# Patient Record
Sex: MALE | Race: OTHER | URBAN - METROPOLITAN AREA
[De-identification: names, ages, dates, MRNs, and addresses within clinical notes are randomized per-mention and may not be internally consistent; named-entity substitution may affect disease eponyms.]

---

## 2022-06-04 ENCOUNTER — TELEPHONE ENCOUNTER (OUTPATIENT)
Dept: URBAN - METROPOLITAN AREA CLINIC 68 | Facility: CLINIC | Age: 87
End: 2022-06-04

## 2022-06-05 ENCOUNTER — TELEPHONE ENCOUNTER (OUTPATIENT)
Dept: URBAN - METROPOLITAN AREA CLINIC 68 | Facility: CLINIC | Age: 87
End: 2022-06-05

## 2022-06-25 ENCOUNTER — TELEPHONE ENCOUNTER (OUTPATIENT)
Age: 87
End: 2022-06-25

## 2022-06-26 ENCOUNTER — TELEPHONE ENCOUNTER (OUTPATIENT)
Age: 87
End: 2022-06-26

## 2024-11-10 ENCOUNTER — NURSING HOME VISIT (OUTPATIENT)
Dept: POST ACUTE CARE | Facility: EXTERNAL LOCATION | Age: 89
End: 2024-11-10
Payer: MEDICARE

## 2024-11-10 DIAGNOSIS — R60.0 EDEMA OF LEFT UPPER EXTREMITY: Primary | ICD-10-CM

## 2024-11-10 PROCEDURE — 99304 1ST NF CARE SF/LOW MDM 25: CPT | Performed by: INTERNAL MEDICINE

## 2024-11-10 NOTE — PROGRESS NOTES
Pt was seen in the NH.  Pt is 89 yo male with PMH CAD CCOPD pacemaker with aggressive B cell lymphoma with mets here for rehab co left upper ext swelling and left shoulder pain  General appearance: Comfortable, no distress  ROS: No SOB  Medications reviewed  Head: Normal  Neck: Soft  Heart: Regular  Lungs: Clear  Abdomen: soft  Ext left upper ext moderate edema    Plan:   1) left shoulder xr  2) Venous doppler left upper ext.    Problem List Items Addressed This Visit    None  Visit Diagnoses       Edema of left upper extremity    -  Primary

## 2024-11-10 NOTE — LETTER
Patient: Justin Burciaga  : 1935    Encounter Date: 11/10/2024    Pt was seen in the NH.  Pt is 89 yo male with PMH CAD CCOPD pacemaker with aggressive B cell lymphoma with mets here for rehab co left upper ext swelling and left shoulder pain  General appearance: Comfortable, no distress  ROS: No SOB  Medications reviewed  Head: Normal  Neck: Soft  Heart: Regular  Lungs: Clear  Abdomen: soft  Ext left upper ext moderate edema    Plan:   1) left shoulder xr  2) Venous doppler left upper ext.    Problem List Items Addressed This Visit    None  Visit Diagnoses       Edema of left upper extremity    -  Primary               Electronically Signed By: Aj Jarvis MD   11/10/24  6:11 PM

## 2024-11-15 ENCOUNTER — LAB REQUISITION (OUTPATIENT)
Dept: LAB | Facility: HOSPITAL | Age: 89
End: 2024-11-15
Payer: MEDICARE

## 2024-11-15 DIAGNOSIS — K76.9 LIVER DISEASE, UNSPECIFIED: ICD-10-CM

## 2024-11-15 LAB — AMMONIA PLAS-SCNC: 17 UMOL/L (ref 16–53)

## 2024-11-15 PROCEDURE — 82140 ASSAY OF AMMONIA: CPT | Mod: OUT | Performed by: INTERNAL MEDICINE

## 2024-12-17 ENCOUNTER — NURSING HOME VISIT (OUTPATIENT)
Dept: POST ACUTE CARE | Facility: EXTERNAL LOCATION | Age: 89
End: 2024-12-17
Payer: MEDICARE

## 2024-12-17 DIAGNOSIS — C83.30 DIFFUSE LARGE B-CELL LYMPHOMA, UNSPECIFIED BODY REGION (MULTI): Primary | ICD-10-CM

## 2024-12-17 PROCEDURE — 99308 SBSQ NF CARE LOW MDM 20: CPT | Performed by: INTERNAL MEDICINE

## 2024-12-17 NOTE — PROGRESS NOTES
MD Angie Sosa MA   Caller: Unspecified (Today, 11:30 AM)             Local ED, he's had kidney stones in the past      Patient informed. Patient verbalized understanding.   Pt was seen in the NH.  Pt is in usual state , no complaint  General appearance: Comfortable, no distress  ROS: No SOB  Medications reviewed  Head: Normal  Neck: Soft  Heart: Regular  Lungs: Clear  Abdomen: soft    Plan:   1)comfort care  2) To continue morphine 25 mg q 2 h prn    Problem List Items Addressed This Visit       Diffuse large B-cell lymphoma - Primary

## 2024-12-17 NOTE — LETTER
Patient: Justin Burciaga  : 1935    Encounter Date: 2024    Pt was seen in the NH.  Pt is in usual state , no complaint  General appearance: Comfortable, no distress  ROS: No SOB  Medications reviewed  Head: Normal  Neck: Soft  Heart: Regular  Lungs: Clear  Abdomen: soft    Plan:   1)comfort care  2) To continue morphine 25 mg q 2 h prn    Problem List Items Addressed This Visit       Diffuse large B-cell lymphoma - Primary          Electronically Signed By: Aj Jarvis MD   24  4:58 PM

## 2025-01-28 ENCOUNTER — NURSING HOME VISIT (OUTPATIENT)
Dept: POST ACUTE CARE | Facility: EXTERNAL LOCATION | Age: OVER 89
End: 2025-01-28
Payer: MEDICARE

## 2025-01-28 DIAGNOSIS — C83.30 DIFFUSE LARGE B-CELL LYMPHOMA, UNSPECIFIED BODY REGION (MULTI): ICD-10-CM

## 2025-01-28 PROCEDURE — 99308 SBSQ NF CARE LOW MDM 20: CPT | Performed by: INTERNAL MEDICINE

## 2025-01-28 NOTE — PROGRESS NOTES
Pt was seen in the NH.  Pt co mild cough no other symptoms  General appearance: Comfortable, no distress  ROS: No SOB  Medications reviewed  Head: Normal  Neck: Soft  Heart: Regular  Lungs: Clear  Abdomen: soft    Plan:   1)comfort care , hospice   2) To continue morphine 5 mg q 2 hr prn  3)  mucinex 600 mg bid x 7 days    Problem List Items Addressed This Visit       Diffuse large B-cell lymphoma

## 2025-01-28 NOTE — LETTER
Patient: Justin Burciaga  : 1935    Encounter Date: 2025    Pt was seen in the NH.  Pt co mild cough no other symptoms  General appearance: Comfortable, no distress  ROS: No SOB  Medications reviewed  Head: Normal  Neck: Soft  Heart: Regular  Lungs: Clear  Abdomen: soft    Plan:   1)comfort care , hospice   2) To continue morphine 5 mg q 2 hr prn  3)  mucinex 600 mg bid x 7 days    Problem List Items Addressed This Visit       Diffuse large B-cell lymphoma          Electronically Signed By: Aj Jarvis MD   25  5:23 PM

## 2025-02-25 ENCOUNTER — NURSING HOME VISIT (OUTPATIENT)
Dept: POST ACUTE CARE | Facility: EXTERNAL LOCATION | Age: OVER 89
End: 2025-02-25
Payer: MEDICARE

## 2025-02-25 DIAGNOSIS — C83.30 DIFFUSE LARGE B-CELL LYMPHOMA, UNSPECIFIED BODY REGION (MULTI): Primary | ICD-10-CM

## 2025-02-25 PROCEDURE — 99308 SBSQ NF CARE LOW MDM 20: CPT | Performed by: INTERNAL MEDICINE

## 2025-02-25 NOTE — PROGRESS NOTES
Pt was seen in the NH.  Pt is in usual state , no complaint  General appearance: Comfortable, no distress  ROS: No SOB  Medications reviewed  Head: Normal  Neck: Soft  Heart: Regular  Lungs: Clear  Abdomen: soft    Plan:   1)clinically doing fine, comfort care  2) To continue morphine 5 mg q 2 h prn    Problem List Items Addressed This Visit       Diffuse large B-cell lymphoma - Primary

## 2025-03-13 ENCOUNTER — LAB REQUISITION (OUTPATIENT)
Dept: LAB | Facility: HOSPITAL | Age: OVER 89
End: 2025-03-13
Payer: MEDICARE

## 2025-03-13 DIAGNOSIS — I48.20 CHRONIC ATRIAL FIBRILLATION, UNSPECIFIED (MULTI): ICD-10-CM

## 2025-03-13 DIAGNOSIS — I25.10 ATHEROSCLEROTIC HEART DISEASE OF NATIVE CORONARY ARTERY WITHOUT ANGINA PECTORIS: ICD-10-CM

## 2025-03-13 LAB — AMMONIA PLAS-SCNC: 25 UMOL/L (ref 16–53)

## 2025-03-13 PROCEDURE — 82140 ASSAY OF AMMONIA: CPT | Mod: OUT | Performed by: INTERNAL MEDICINE

## 2025-03-19 ENCOUNTER — LAB REQUISITION (OUTPATIENT)
Dept: LAB | Facility: HOSPITAL | Age: OVER 89
End: 2025-03-19
Payer: MEDICARE

## 2025-03-19 DIAGNOSIS — Z79.899 OTHER LONG TERM (CURRENT) DRUG THERAPY: ICD-10-CM

## 2025-03-19 LAB — AMMONIA PLAS-SCNC: 32 UMOL/L (ref 16–53)

## 2025-03-19 PROCEDURE — 82140 ASSAY OF AMMONIA: CPT | Mod: OUT | Performed by: INTERNAL MEDICINE

## 2025-03-25 ENCOUNTER — NURSING HOME VISIT (OUTPATIENT)
Dept: POST ACUTE CARE | Facility: EXTERNAL LOCATION | Age: OVER 89
End: 2025-03-25
Payer: MEDICARE

## 2025-03-25 DIAGNOSIS — C83.30 DIFFUSE LARGE B-CELL LYMPHOMA, UNSPECIFIED BODY REGION (MULTI): Primary | ICD-10-CM

## 2025-03-25 PROCEDURE — 99308 SBSQ NF CARE LOW MDM 20: CPT | Performed by: INTERNAL MEDICINE

## 2025-03-25 NOTE — LETTER
Patient: Justin Burciaga  : 1935    Encounter Date: 2025    Pt was seen in the NH.  Pt is in usual state , no complaint  General appearance: Comfortable, no distress  ROS: No SOB  Medications reviewed  Head: Normal  Neck: Soft  Heart: Regular  Lungs: Clear  Abdomen: soft    Plan:   1)comfort care  2) To continue oxycodone 5 mg tid    Problem List Items Addressed This Visit       Diffuse large B-cell lymphoma - Primary          Electronically Signed By: Aj Jarvis MD   3/25/25  4:28 PM

## 2025-03-25 NOTE — PROGRESS NOTES
Pt was seen in the NH.  Pt is in usual state , no complaint  General appearance: Comfortable, no distress  ROS: No SOB  Medications reviewed  Head: Normal  Neck: Soft  Heart: Regular  Lungs: Clear  Abdomen: soft    Plan:   1)comfort care  2) To continue oxycodone 5 mg tid    Problem List Items Addressed This Visit       Diffuse large B-cell lymphoma - Primary

## 2025-04-29 ENCOUNTER — NURSING HOME VISIT (OUTPATIENT)
Dept: POST ACUTE CARE | Facility: EXTERNAL LOCATION | Age: OVER 89
End: 2025-04-29
Payer: MEDICARE

## 2025-04-29 DIAGNOSIS — I48.0 PAROXYSMAL ATRIAL FIBRILLATION (MULTI): Primary | ICD-10-CM

## 2025-04-29 PROCEDURE — 99308 SBSQ NF CARE LOW MDM 20: CPT | Performed by: INTERNAL MEDICINE

## 2025-04-29 NOTE — PROGRESS NOTES
Pt was seen in the NH.  Pt is in usual state , no complaint  General appearance: Comfortable, no distress  ROS: No SOB  Medications reviewed  Head: Normal  Neck: Soft  Heart: Regular  Lungs: Clear  Abdomen: soft    Plan:   1)clinically doing fine  2) To continue coumadin    Problem List Items Addressed This Visit       Paroxysmal atrial fibrillation (Multi) - Primary

## 2025-04-29 NOTE — LETTER
Patient: Justin Burciaga  : 1935    Encounter Date: 2025    Pt was seen in the NH.  Pt is in usual state , no complaint  General appearance: Comfortable, no distress  ROS: No SOB  Medications reviewed  Head: Normal  Neck: Soft  Heart: Regular  Lungs: Clear  Abdomen: soft    Plan:   1)clinically doing fine  2) To continue coumadin    Problem List Items Addressed This Visit       Paroxysmal atrial fibrillation (Multi) - Primary          Electronically Signed By: Aj Jarvis MD   25  6:05 PM

## 2025-05-18 ENCOUNTER — NURSING HOME VISIT (OUTPATIENT)
Dept: POST ACUTE CARE | Facility: EXTERNAL LOCATION | Age: OVER 89
End: 2025-05-18
Payer: MEDICARE

## 2025-05-18 DIAGNOSIS — C83.30 DIFFUSE LARGE B-CELL LYMPHOMA, UNSPECIFIED BODY REGION (MULTI): Primary | ICD-10-CM

## 2025-05-18 PROCEDURE — 99308 SBSQ NF CARE LOW MDM 20: CPT | Performed by: INTERNAL MEDICINE

## 2025-05-18 NOTE — LETTER
Patient: Justin Burciaga  : 1935    Encounter Date: 2025    Pt was seen in the NH.  Pt is in usual state , no complaint  General appearance: Comfortable, no distress  ROS: No SOB  Medications reviewed  Head: Normal  Neck: Soft  Heart: Regular  Lungs: Clear  Abdomen: soft    Plan:   1)comfort care  2) To continue morphine 5 mg q2 h prn    Problem List Items Addressed This Visit       Diffuse large B-cell lymphoma - Primary          Electronically Signed By: Aj Jarvis MD   25  7:14 PM

## 2025-05-18 NOTE — PROGRESS NOTES
Pt was seen in the NH.  Pt is in usual state , no complaint  General appearance: Comfortable, no distress  ROS: No SOB  Medications reviewed  Head: Normal  Neck: Soft  Heart: Regular  Lungs: Clear  Abdomen: soft    Plan:   1)comfort care  2) To continue morphine 5 mg q2 h prn    Problem List Items Addressed This Visit       Diffuse large B-cell lymphoma - Primary